# Patient Record
Sex: MALE | Race: WHITE | NOT HISPANIC OR LATINO | URBAN - METROPOLITAN AREA
[De-identification: names, ages, dates, MRNs, and addresses within clinical notes are randomized per-mention and may not be internally consistent; named-entity substitution may affect disease eponyms.]

---

## 2023-04-28 ENCOUNTER — OFFICE VISIT (OUTPATIENT)
Dept: FAMILY MEDICINE CLINIC | Facility: CLINIC | Age: 28
End: 2023-04-28

## 2023-04-28 VITALS
TEMPERATURE: 97.5 F | DIASTOLIC BLOOD PRESSURE: 68 MMHG | BODY MASS INDEX: 23.37 KG/M2 | OXYGEN SATURATION: 99 % | SYSTOLIC BLOOD PRESSURE: 110 MMHG | HEIGHT: 70 IN | WEIGHT: 163.2 LBS | HEART RATE: 66 BPM

## 2023-04-28 DIAGNOSIS — R45.851 SUICIDAL THOUGHTS: ICD-10-CM

## 2023-04-28 DIAGNOSIS — F32.A DEPRESSION, UNSPECIFIED DEPRESSION TYPE: Primary | ICD-10-CM

## 2023-04-28 DIAGNOSIS — F41.9 ANXIETY: ICD-10-CM

## 2023-04-28 RX ORDER — HYDROXYZINE PAMOATE 25 MG/1
CAPSULE ORAL
COMMUNITY
Start: 2023-04-21

## 2023-04-28 RX ORDER — GABAPENTIN 100 MG/1
100 CAPSULE ORAL DAILY
COMMUNITY
Start: 2023-04-20

## 2023-04-28 NOTE — ASSESSMENT & PLAN NOTE
"Presents as new patient today, looking to establish care and mental health medication management  Lengthy conversation  - pt with long standing history depression, anxiety, paranoia and SI (see HPI)  Starting SI in third grade  Reports he was never evaluated as a child  But as an adult, he has been on Celexa and Prozac  through family doctor only  Reports he has suicidal thought daily, but would \"never do it\"  He denies any plan and no hisotry of self harm  But that he does think about it daily - every morning and before bed  Occasionally in the afternoon  this has been ongoing for years  No psychiatric evaluation until recently -  evaluated by a psychiatrist through a tele health visit, and was started on Sertraline and Gabapentin  It was advised he may benefit from Seroquel, but they were going to discuss at follow up visit  Future plan was to start Seroquel at a subsequent visit  Follow up was scheduled for 4/25  He canceled that appt,  in hopes that he could establish here and have us (PCP) manage  We discussed that with his long history of suicidal ideations and paranoia thoughts, he really needs an in depth mental health evaluation so he can receive a proper diagnosis and appropriate medication treatment  Advise he continue with the Telehealth psychiatrist for now until he can establish with an in person provider  Verbal contract with patient today for safety and he was given resources (walk in clinic address, Crisis number)  He reports having a supportive fiance (she is an PA-c in [employer] urgent care)  They live together and she is aware of his past and present mental health  He was advised immediate ER, should he have increased thoughts or develop a suicidal plan  Referral placed today to establish in network - both psychiatry and psychology  Pt hesitant for therapy (as he has concerns he will be talked about after the fact) but is agreeable to referral and will consider   Will also place referral to social " work to assist with other resources if needed  Lastly, patient to have records form prior PCP transferred  Will review once received and will have him return for preventative/wellness exam and update preventative care as needed  Pt verbalizes agreement with all above plans

## 2023-04-28 NOTE — PROGRESS NOTES
"[address]    ASSESSMENT AND PLAN     1  Depression, unspecified depression type  Assessment & Plan:  Presents as new patient today, looking to establish care and mental health medication management  Lengthy conversation  - pt with long standing history depression, anxiety, paranoia and SI (see HPI)  Starting SI in third grade  Reports he was never evaluated as a child  But as an adult, he has been on Celexa and Prozac  through family doctor only  Reports he has suicidal thought daily, but would \"never do it\"  He denies any plan and no hisotry of self harm  But that he does think about it daily - every morning and before bed  Occasionally in the afternoon  this has been ongoing for years  No psychiatric evaluation until recently -  evaluated by a psychiatrist through a tele health visit, and was started on Sertraline and Gabapentin  It was advised he may benefit from Seroquel, but they were going to discuss at follow up visit  Future plan was to start Seroquel at a subsequent visit  Follow up was scheduled for 4/25  He canceled that appt,  in hopes that he could establish here and have us (PCP) manage  We discussed that with his long history of suicidal ideations and paranoia thoughts, he really needs an in depth mental health evaluation so he can receive a proper diagnosis and appropriate medication treatment  Advise he continue with the Telehealth psychiatrist for now until he can establish with an in person provider  Verbal contract with patient today for safety and he was given resources (walk in clinic address, Crisis number)  He reports having a supportive fiance (she is an PA-c in [employer] [facility])  They live together and she is aware of his past and present mental health  He was advised immediate ER, should he have increased thoughts or develop a suicidal plan  Referral placed today to establish in network - both psychiatry and psychology   Pt hesitant for therapy (as he has concerns he will " be talked about after the fact) but is agreeable to referral and will consider  Will also place referral to social work to assist with other resources if needed  Lastly, patient to have records form prior PCP transferred  Will review once received and will have him return for preventative/wellness exam and update preventative care as needed  Pt verbalizes agreement with all above plans  Orders:  -     Ambulatory Referral to Psychiatry; Future  -     Ambulatory Referral to Psychology; Future  -     Ambulatory Referral to Social Work Care Management Program; Future    2  Suicidal thoughts  -     Ambulatory Referral to Social Work Care Management Program; Future    3  Anxiety  -     Ambulatory Referral to Social Work Care Management Program; Future         SUBJECTIVE       Patient ID: Richard Saleem is a 28 y o  male  Chief Complaint   Patient presents with   • Depression   • Anxiety     Pt states that he has had on and off anxiety/depression for the extent of his life  He has had suicidal thoughts in the morning and at night for the past two years  Pt states that he has had social anxiety forever  Pt states that he assumes that people are talking badly about him all of the time  He knows that they probably aren't but he can't convince himself that  He states that it keeps him from doing things  HISTORY OF PRESENT ILLNESS     Patient presents today to establish care in our practice  Several years since last preventative visit  He presents today to discuss severe depression  His adryan (a physician assistant with [facility]) scheduled this  appointment for him  He has struggled with depression, anxiety, and social phobia for years  He reports he has had suicidal thoughts/ideations since the third grade  Never any suicidal attempts - no cutting or other self harm behaviors  Does report though that he has current suicidal thoughts almost daily, for the last 2 years (no active plan)    Although never diagnosed "with paranoia, he admits today having paranoid thoughts daily-continually thinking that people are talking about him or laughing behind his back  He was recently seen through an online psychiatrist service ([facility]), Dr Zilphia Cooks  He was prescribed sertraline 50 and gabapentin 100 at at bedtime, with plan to likely add Seroquel  This tele vist was his first visit with a mental health professional   He has been treated for depression in the past by his PCP with other SSRIs  He reports not having responded well to them: Celexa (not effective), Prozac (side effects - ED)  He is a teacher in [employer]  He was scheduled for a follow-up yesterday, but canceled the appointment  He reports his fiance scheduled this appointment today, hoping that we would manage his mental health medications  The following portions of the patient's history were reviewed and updated as appropriate: allergies, current medications, past family history, past medical history, past social history, past surgical history, and problem list     REVIEW OF SYSTEMS  Review of Systems   Constitutional: Negative  Respiratory: Negative  Cardiovascular: Negative  Neurological: Negative  Psychiatric/Behavioral: Positive for dysphoric mood, sleep disturbance and suicidal ideas  Negative for self-injury  The patient is nervous/anxious           Paranoid thoughts       OBJECTIVE      VITAL SIGNS  /68 (BP Location: Left arm, Patient Position: Sitting, Cuff Size: Standard)   Pulse 66   Temp 97 5 °F (36 4 °C)   Ht 5' 10\" (1 778 m)   Wt 74 kg (163 lb 3 2 oz)   SpO2 99%   BMI 23 42 kg/m²     CURRENT MEDICATIONS    Current Outpatient Medications:   •  hydrOXYzine pamoate (VISTARIL) 25 mg capsule, TAKE 1 CAPSULE BY MOUTH AS NEEDED FOR ANXIETY UP TO TWICE A DAY , Disp: , Rfl:   •  sertraline (ZOLOFT) 50 mg tablet, , Disp: , Rfl:   •  gabapentin (NEURONTIN) 100 mg capsule, Take 100 mg by mouth daily (Patient " not taking: Reported on 4/28/2023), Disp: , Rfl:     Current Facility-Administered Medications:   •  ropivacaine (NAROPIN) 0 5 % injection 10 mL, 10 mL, Infiltration, , Miguel Cluck, DPM, 10 mL at 04/12/23 1804  •  triamcinolone acetonide (KENALOG-40) 40 mg/mL injection 20 mg, 20 mg, Intra-articular, , Miguel Cluck, DPM, 20 mg at 04/12/23 1804      PHYSICAL EXAMINATION   Physical Exam  Vitals and nursing note reviewed  Constitutional:       General: He is not in acute distress  Appearance: Normal appearance  He is not ill-appearing  HENT:      Head: Normocephalic  Pulmonary:      Effort: Pulmonary effort is normal  No respiratory distress  Neurological:      Mental Status: He is alert and oriented to person, place, and time  Psychiatric:         Attention and Perception: Attention normal          Mood and Affect: Mood is anxious  Affect is flat and tearful (few periods during the visit)  Speech: Speech normal  Speech is not rapid and pressured  Behavior: Behavior normal  Behavior is not hyperactive  Thought Content: Thought content is paranoid  Thought content does not include homicidal or suicidal ideation  Thought content does not include homicidal or suicidal plan  Cognition and Memory: Cognition normal          Judgment: Judgment normal  Judgment is not impulsive

## 2023-05-01 ENCOUNTER — TELEPHONE (OUTPATIENT)
Dept: PSYCHIATRY | Facility: CLINIC | Age: 28
End: 2023-05-01

## 2023-05-01 ENCOUNTER — EVALUATION (OUTPATIENT)
Dept: PHYSICAL THERAPY | Facility: CLINIC | Age: 28
End: 2023-05-01

## 2023-05-01 DIAGNOSIS — M24.874 OTHER SPECIFIC JOINT DERANGEMENTS OF RIGHT FOOT, NOT ELSEWHERE CLASSIFIED: ICD-10-CM

## 2023-05-01 DIAGNOSIS — M25.579 SINUS TARSI SYNDROME, UNSPECIFIED LATERALITY: ICD-10-CM

## 2023-05-01 DIAGNOSIS — M25.571 SINUS TARSI SYNDROME, RIGHT: Primary | ICD-10-CM

## 2023-05-01 DIAGNOSIS — M24.571 EQUINUS CONTRACTURE OF RIGHT ANKLE: ICD-10-CM

## 2023-05-01 NOTE — PROGRESS NOTES
PT Evaluation     Today's date: 2023  Patient name: Rashard Mirza  : 1995  MRN: [id number]  Referring provider: Bre Dixon DPM  Dx:   Encounter Diagnosis     ICD-10-CM    1  Sinus tarsi syndrome, right  M25 571 Ambulatory referral to Physical Therapy      2  Other specific joint derangements of right foot, not elsewhere classified  M24 874 Ambulatory referral to Physical Therapy      3  Equinus contracture of right ankle  M24 571 Ambulatory referral to Physical Therapy      4  Sinus tarsi syndrome, unspecified laterality  M25 579                      Assessment  Assessment details: Patient is a 28 y o  male who presents to outpatient PT with pain, decreased rom, decreased strength and decreased functional mobility  He will benefit from skilled PT to address these deficits in order to achieve his goals and maximize his functional mobility  I also re-iterated then benefits of custom orthotics for his condition and that he should follow up with podiatry regarding this  Goals  Short Term Goals: Independent performance of initial hep  Decrease pain 2 points on VAS      Long Term Goals: Independent performance of comprehensive hep  Work performance is returned to max level of function  Performance of IADL's is returned to max level of function  Performance in recreational activities is improved to max level of function  Able to return to running    Plan  Plan details: 3-4 session of formal PT for progress on HEP  Planned therapy interventions: abdominal trunk stabilization, IADL retraining, joint mobilization, manual therapy, massage, strengthening, stretching, therapeutic activities, therapeutic exercise, therapeutic training, transfer training and home exercise program        Subjective Evaluation    History of Present Illness  Mechanism of injury: Pt reports that he has been having R foot/ankle pain since summer of 2022 possible after over-training while running      Reports that since that time he  has stopped running  Reports that he underwent a cortisone injection with good pain reduction but that he still has not found a shoe that is comfortable for him  Reports that orthotics have been recommended for him  Reports that he would like to return to running marathon distance  Pain  Current pain ratin  At worst pain ratin    Patient Goals  Patient goals for therapy: decreased pain, increased motion, increased strength, independence with ADLs/IADLs and return to sport/leisure activities          Objective   ROM:    DF:  PF:   INV:  EV      STRENGTH:  Df:   Pf:   INV:   Ev:        Talar tilt test: neg    Pt has difficulty activating post-tibialis in standing and sitting  Pes planus  Tight gastroc B/L  Tight HS B/L    Valgus noted with single leq squat    R hip strength:  Flexion : -/5  Ext   Abd: -/5  Ir:   ER          Precautions: sinus tarsi syndrome R    Manuals                                                                 Neuro Re-Ed                                                                                                        Ther Ex             Arch raise             gastroc stretch             Single leg squat with pball abd             Single leg bridge             sidestepping             slr x 4                                        Ther Activity             bike                          Gait Training             Running analysis on TM                          Modalities

## 2023-05-01 NOTE — TELEPHONE ENCOUNTER
Patient has been added to the adult psych wait list     Confirmed Insurance: Yes [x]  Location Preference: [place] & [place]  Provider Preference: [facility]  Virtual: Yes [] No [x]

## 2023-05-03 ENCOUNTER — APPOINTMENT (OUTPATIENT)
Dept: PHYSICAL THERAPY | Facility: CLINIC | Age: 28
End: 2023-05-03
Payer: COMMERCIAL

## 2023-05-08 ENCOUNTER — OFFICE VISIT (OUTPATIENT)
Dept: PHYSICAL THERAPY | Facility: CLINIC | Age: 28
End: 2023-05-08

## 2023-05-08 DIAGNOSIS — M25.571 SINUS TARSI SYNDROME, RIGHT: Primary | ICD-10-CM

## 2023-05-08 DIAGNOSIS — M24.874 OTHER SPECIFIC JOINT DERANGEMENTS OF RIGHT FOOT, NOT ELSEWHERE CLASSIFIED: ICD-10-CM

## 2023-05-08 NOTE — PROGRESS NOTES
"Daily Note     Today's date: 2023  Patient name: Kira Lacy  : 1995  MRN: [id number]  Referring provider: Leann Rivera DPM  Dx:   Encounter Diagnosis     ICD-10-CM    1  Sinus tarsi syndrome, right  M25 571       2  Other specific joint derangements of right foot, not elsewhere classified  M24 874                      Subjective: pt reports compliance with his hep and that he is having no difficulty with it  Reports that he is noticing a slight decreased \"in that weird feeling\" in my shoe  Objective: See treatment diary below      Assessment: progressed therex as listed, provided pt with updated hep and blue tb in order to complete this independently  Instructed pt to call the clinic if he has question with his hep or if symptoms fail to improve  If no f/u is requested plan to DC to hep in 4 weeks           Plan: f/u prn     Precautions: sinus tarsi syndrome R    Manuals                                                                 Neuro Re-Ed                                                                                                        Ther Ex             Arch raise             gastroc stretch             Single leg squat with pball abd 2x10            Single leg bridge x20            sidestepping Blue tb x20            slr x 4              SLS with trunk rotation Blue tb x10ea                         Ther Activity             bike                          Gait Training             Running analysis on TM                          Modalities                                            "

## 2023-05-09 ENCOUNTER — PATIENT OUTREACH (OUTPATIENT)
Dept: FAMILY MEDICINE CLINIC | Facility: HOSPITAL | Age: 28
End: 2023-05-09

## 2023-05-09 NOTE — PROGRESS NOTES
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

## 2023-05-10 ENCOUNTER — APPOINTMENT (OUTPATIENT)
Dept: PHYSICAL THERAPY | Facility: CLINIC | Age: 28
End: 2023-05-10
Payer: COMMERCIAL

## 2023-05-24 ENCOUNTER — OFFICE VISIT (OUTPATIENT)
Dept: PODIATRY | Facility: CLINIC | Age: 28
End: 2023-05-24

## 2023-05-24 VITALS
DIASTOLIC BLOOD PRESSURE: 65 MMHG | WEIGHT: 163 LBS | HEIGHT: 70 IN | BODY MASS INDEX: 23.34 KG/M2 | HEART RATE: 78 BPM | SYSTOLIC BLOOD PRESSURE: 109 MMHG

## 2023-05-24 DIAGNOSIS — M24.874 OTHER SPECIFIC JOINT DERANGEMENTS OF RIGHT FOOT, NOT ELSEWHERE CLASSIFIED: Primary | ICD-10-CM

## 2023-05-24 DIAGNOSIS — M21.42 PES PLANUS OF BOTH FEET: ICD-10-CM

## 2023-05-24 DIAGNOSIS — M21.41 PES PLANUS OF BOTH FEET: ICD-10-CM

## 2023-05-24 DIAGNOSIS — M24.571 EQUINUS CONTRACTURE OF RIGHT ANKLE: ICD-10-CM

## 2023-05-24 NOTE — PROGRESS NOTES
PATIENT:  Paul Lau  1995       ASSESSMENT:     1  Other specific joint derangements of right foot, not elsewhere classified        2  Equinus contracture of right ankle        3  Pes planus of both feet                  PLAN:  1  Reviewed medical records  Reviewed the notes from PT  Patient was counseled and educated on the condition and the diagnosis  2  The diagnosis, treatment options and prognosis were discussed with the patient  3  He responded to the injection well  Instructed supportive care, home exercise, icing, and footwear/ arch support  4  Biomechanical exam was performed and he was casted for orthotics  5  Will call him when it is ready to be picked  Imaging: I have personally reviewed pertinent films in PACS  Labs, pathology, and Other Studies: I have personally reviewed pertinent reports  Procedures      Subjective:       HPI  The patient presents for follow-up on pain in right ankle/ foot  Pain resolved after the injection  He had a couple of sessions of PT  No swelling  No associated numbness or paresthesia  No significant weakness or dysfunction  The following portions of the patient's history were reviewed and updated as appropriate: allergies, current medications, past family history, past medical history, past social history, past surgical history and problem list   All pertinent labs and images were reviewed  Past Medical History  Past Medical History:   Diagnosis Date   • Anxiety    • Depression        Past Surgical History  History reviewed  No pertinent surgical history  Allergies:  Patient has no known allergies  Medications:  Current Outpatient Medications   Medication Sig Dispense Refill   • hydrOXYzine pamoate (VISTARIL) 25 mg capsule TAKE 1 CAPSULE BY MOUTH AS NEEDED FOR ANXIETY UP TO TWICE A DAY       • sertraline (ZOLOFT) 50 mg tablet      • gabapentin (NEURONTIN) 100 mg capsule Take 100 mg by mouth daily "(Patient not taking: Reported on 4/28/2023)       Current Facility-Administered Medications   Medication Dose Route Frequency Provider Last Rate Last Admin   • ropivacaine (NAROPIN) 0 5 % injection 10 mL  10 mL Infiltration  Alphonsa Lout, DPM   10 mL at 04/12/23 1804   • triamcinolone acetonide (KENALOG-40) 40 mg/mL injection 20 mg  20 mg Intra-articular  Alphonsa Lout, DPM   20 mg at 04/12/23 1804       Social History:  Social History     Socioeconomic History   • Marital status: Single     Spouse name: None   • Number of children: None   • Years of education: None   • Highest education level: None   Occupational History   • None   Tobacco Use   • Smoking status: Never   • Smokeless tobacco: Never   Vaping Use   • Vaping Use: None   Substance and Sexual Activity   • Alcohol use: Yes     Comment: Social   • Drug use: Never   • Sexual activity: Yes     Partners: Female   Other Topics Concern   • None   Social History Narrative   • None     Social Determinants of Health     Financial Resource Strain: Not on file   Food Insecurity: Not on file   Transportation Needs: Not on file   Physical Activity: Not on file   Stress: Not on file   Social Connections: Not on file   Intimate Partner Violence: Not on file   Housing Stability: Not on file          Review of Systems   Constitutional: Negative for chills and fever  Respiratory: Negative  Cardiovascular: Negative  Gastrointestinal: Negative  Musculoskeletal: Negative for gait problem  Neurological: Negative for weakness and numbness  Objective:      /65   Pulse 78   Ht 5' 10\" (1 778 m)   Wt 73 9 kg (163 lb)   BMI 23 39 kg/m²          Physical Exam  Vitals reviewed  Constitutional:       General: He is not in acute distress  Appearance: Normal appearance  He is not ill-appearing or toxic-appearing  Cardiovascular:      Rate and Rhythm: Normal rate and regular rhythm  Pulses: Normal pulses             Dorsalis pedis pulses are 2+ on the " right side and 2+ on the left side  Posterior tibial pulses are 2+ on the right side and 2+ on the left side  Pulmonary:      Effort: Pulmonary effort is normal  No respiratory distress  Musculoskeletal:         General: Deformity present  No swelling, tenderness or signs of injury  Right lower leg: No edema  Left lower leg: No edema  Right foot: No foot drop  Left foot: No foot drop  Comments: Flexible pes planus presents with hyperpronation at STJ  Improved right STJ ROM  No pain at sinus tarsi / STJ right foot  Skin:     General: Skin is warm  Capillary Refill: Capillary refill takes less than 2 seconds  Coloration: Skin is not cyanotic or mottled  Findings: No abscess, ecchymosis, erythema or wound  Nails: There is no clubbing  Neurological:      General: No focal deficit present  Mental Status: He is alert and oriented to person, place, and time  Cranial Nerves: No cranial nerve deficit  Sensory: No sensory deficit  Motor: No weakness  Coordination: Coordination normal    Psychiatric:         Mood and Affect: Mood normal          Behavior: Behavior normal          Thought Content:  Thought content normal          Judgment: Judgment normal

## 2023-06-08 ENCOUNTER — PATIENT OUTREACH (OUTPATIENT)
Dept: FAMILY MEDICINE CLINIC | Facility: CLINIC | Age: 28
End: 2023-06-08

## 2023-06-08 NOTE — PROGRESS NOTES
NANDO OCHOA called patient ([phone]) a second time  Patient answered, NANDO OCHOA introduced NANDO OCHOA role  Patient currently at work but has time to speak  Patient stated he is on wait list at [facility] for two different locations  NANDO OCHOA provided patient with instructions to find additional providers on insurance portal and to call other office to get on another wait list  Patient stated that he has had fleeting SI but not plan  Patient considers his fiance a support and talks with her about his mental health occasionally  NANDO OCHOA instructed patient that if he is experiencing a mental health emergency, he should call the crisis hotline, 911 or go to there ER  Patient had no other questions, concerns or needs  NANDO OCHOA will follow up regarding in a few weeks

## 2023-06-30 ENCOUNTER — PATIENT OUTREACH (OUTPATIENT)
Dept: FAMILY MEDICINE CLINIC | Facility: CLINIC | Age: 28
End: 2023-06-30

## 2023-06-30 NOTE — PROGRESS NOTES
NANDO OCHOA called patient ([phone]) to follow up regarding establishing care with a [facility] provider  Patient did not answer, NANDO OCHOA left a message including NANDO Ochoa contact information and requested a call back  NANDO OCHOA will attempt to outreach again at a later date

## 2023-07-20 ENCOUNTER — PATIENT OUTREACH (OUTPATIENT)
Dept: FAMILY MEDICINE CLINIC | Facility: CLINIC | Age: 28
End: 2023-07-20

## 2023-07-20 NOTE — PROGRESS NOTES
NANDO OCHOA called patient ([phone]) a second to follow up regarding establishing care with a [address] [place] [address] provider. Patient did not answer, NANDO OCHOA left a message including NANDO Ochoa contact information and requested a call back. NANDO OCHOA will send unable to reach letter via [employer] to patient and close. Please re consult NANDO OCHOA as needed.

## 2023-07-20 NOTE — LETTER
[id number] [address] [phone]    Re: Jak Brochure to Reach   7/20/2023       Dear Kathy Monzon am a [address] Work  and wanted to be certain you had information to contact me should you desire assistance with or have questions about non-medical aspects of your care such as [but not limited to] medical insurance, housing, transportation, material needs, or emergency needs. If I do not have an answer I will assist you in finding the appropriate agency or individual who can help. Please feel free to contact me at [phone]. Thank You.     Sincerely,         SANJEEV Horan

## 2023-07-27 ENCOUNTER — TELEPHONE (OUTPATIENT)
Dept: PODIATRY | Facility: CLINIC | Age: 28
End: 2023-07-27

## 2023-07-27 NOTE — TELEPHONE ENCOUNTER
Caller: Jany De Los Santos    Doctor/Office: Dr. Aron Flynn    #: [phone]    Escalation: No returned call/He was casted for orthotics end of May but has not heard back about picking them up at office? Please call pt and advise as to the situation.  Thanks

## 2023-08-09 ENCOUNTER — OFFICE VISIT (OUTPATIENT)
Dept: PRIMARY CARE | Facility: CLINIC | Age: 28
End: 2023-08-09
Payer: COMMERCIAL

## 2023-08-09 VITALS
DIASTOLIC BLOOD PRESSURE: 70 MMHG | HEART RATE: 75 BPM | WEIGHT: 203 LBS | OXYGEN SATURATION: 97 % | SYSTOLIC BLOOD PRESSURE: 104 MMHG

## 2023-08-09 DIAGNOSIS — Z13.220 LIPID SCREENING: Primary | ICD-10-CM

## 2023-08-09 DIAGNOSIS — L73.0 ACNE KELOIDALIS NUCHAE: ICD-10-CM

## 2023-08-09 LAB
CHOLESTEROL (MG/DL) IN SER/PLAS: 230 MG/DL (ref 0–199)
CHOLESTEROL IN HDL (MG/DL) IN SER/PLAS: 57.9 MG/DL
CHOLESTEROL/HDL RATIO: 4
LDL: 153 MG/DL (ref 0–99)
TRIGLYCERIDE (MG/DL) IN SER/PLAS: 95 MG/DL (ref 0–149)
VLDL: 19 MG/DL (ref 0–40)

## 2023-08-09 PROCEDURE — 80061 LIPID PANEL: CPT

## 2023-08-09 PROCEDURE — 99213 OFFICE O/P EST LOW 20 MIN: CPT | Performed by: FAMILY MEDICINE

## 2023-08-09 RX ORDER — CLINDAMYCIN HYDROCHLORIDE 300 MG/1
300 CAPSULE ORAL 3 TIMES DAILY
Qty: 42 CAPSULE | Refills: 0 | Status: SHIPPED | OUTPATIENT
Start: 2023-08-09 | End: 2023-08-23

## 2023-08-09 RX ORDER — CLOBETASOL PROPIONATE 0.5 MG/G
CREAM TOPICAL 2 TIMES DAILY
Qty: 60 G | Refills: 2 | Status: SHIPPED | OUTPATIENT
Start: 2023-08-09 | End: 2023-11-07

## 2023-08-09 NOTE — PROGRESS NOTES
Subjective   Patient ID: Baljinder Hoffman is a 27 y.o. male who presents for Hyperlipidemia (Cholesterol check , rash on the back of neck ).  HPI  Would cholesterol and DDC screen for hemachromatosis   Would like cholesterol check- father has very high chlosterol concerning for familial     Hx of rash on posterior hair line- chronic, appears to be acne keloid nuchae. Clinda helped but long term doxy did not and upset his stomach. He would like longer course- risk of this discussed    Review of Systems   Constitutional:  Negative for appetite change, fever and unexpected weight change.   HENT:  Negative for congestion and trouble swallowing.    Eyes:  Negative for visual disturbance.   Respiratory:  Negative for shortness of breath.    Cardiovascular:  Negative for chest pain, palpitations and leg swelling.   Gastrointestinal:  Negative for blood in stool, constipation and diarrhea.   Genitourinary:  Negative for difficulty urinating and hematuria.   Musculoskeletal:  Negative for gait problem and joint swelling.   Skin:  Positive for rash. Negative for color change.   Allergic/Immunologic: Negative for immunocompromised state.   Neurological:  Negative for seizures and syncope.   Psychiatric/Behavioral:  Negative for confusion and suicidal ideas. The patient is not nervous/anxious.        Objective   /70   Pulse 75   Wt 92.1 kg (203 lb)   SpO2 97%     Physical Exam  Constitutional:       General: He is not in acute distress.     Appearance: Normal appearance. He is not ill-appearing.   HENT:      Head: Normocephalic and atraumatic.      Right Ear: Tympanic membrane normal.      Left Ear: Tympanic membrane normal.      Nose: Nose normal.      Mouth/Throat:      Mouth: Mucous membranes are moist.   Eyes:      Pupils: Pupils are equal, round, and reactive to light.   Cardiovascular:      Rate and Rhythm: Normal rate and regular rhythm.      Heart sounds: No murmur heard.     No friction rub. No gallop.    Pulmonary:      Effort: Pulmonary effort is normal.      Breath sounds: Normal breath sounds.   Abdominal:      General: Abdomen is flat. There is no distension.      Palpations: Abdomen is soft.      Tenderness: There is no abdominal tenderness. There is no guarding.      Hernia: No hernia is present.   Musculoskeletal:         General: Normal range of motion.   Skin:     General: Skin is warm and dry.   Neurological:      General: No focal deficit present.      Mental Status: He is alert. Mental status is at baseline.      Cranial Nerves: No cranial nerve deficit.      Motor: No weakness.      Gait: Gait normal.   Psychiatric:         Mood and Affect: Mood normal.         Behavior: Behavior normal.         Thought Content: Thought content normal.         Judgment: Judgment normal.         Assessment/Plan   Problem List Items Addressed This Visit    None  Visit Diagnoses       Lipid screening    -  Primary    Relevant Orders    Lipid Panel (Completed)    Acne keloidalis nuchae        Relevant Medications    clindamycin (Cleocin) 300 mg capsule    clobetasol (Temovate) 0.05 % cream          Neck posterior rash: folliculitis (bacteria vs fungal) vs acne keloid vs psorasis   -clindamycin helped but doxy didn't  -clobetasol  -consider topical clinda/benzyl    Fhx for familial hyperlidemia:  -check lipids  -check hfe 2/2 hx

## 2023-08-10 ASSESSMENT — ENCOUNTER SYMPTOMS
JOINT SWELLING: 0
CONSTIPATION: 0
TROUBLE SWALLOWING: 0
UNEXPECTED WEIGHT CHANGE: 0
CONFUSION: 0
COLOR CHANGE: 0
DIARRHEA: 0
HEMATURIA: 0
SHORTNESS OF BREATH: 0
DIFFICULTY URINATING: 0
SEIZURES: 0
NERVOUS/ANXIOUS: 0
PALPITATIONS: 0
APPETITE CHANGE: 0
BLOOD IN STOOL: 0
FEVER: 0

## 2023-08-10 ASSESSMENT — PATIENT HEALTH QUESTIONNAIRE - PHQ9
SUM OF ALL RESPONSES TO PHQ9 QUESTIONS 1 AND 2: 0
1. LITTLE INTEREST OR PLEASURE IN DOING THINGS: NOT AT ALL
2. FEELING DOWN, DEPRESSED OR HOPELESS: NOT AT ALL

## 2023-08-16 ENCOUNTER — APPOINTMENT (OUTPATIENT)
Dept: PRIMARY CARE | Facility: CLINIC | Age: 28
End: 2023-08-16
Payer: COMMERCIAL

## 2023-09-06 ENCOUNTER — OFFICE VISIT (OUTPATIENT)
Dept: PODIATRY | Facility: CLINIC | Age: 28
End: 2023-09-06
Payer: COMMERCIAL

## 2023-09-06 DIAGNOSIS — M21.41 PES PLANUS OF BOTH FEET: ICD-10-CM

## 2023-09-06 DIAGNOSIS — M24.571 EQUINUS CONTRACTURE OF RIGHT ANKLE: ICD-10-CM

## 2023-09-06 DIAGNOSIS — M24.874 OTHER SPECIFIC JOINT DERANGEMENTS OF RIGHT FOOT, NOT ELSEWHERE CLASSIFIED: Primary | ICD-10-CM

## 2023-09-06 DIAGNOSIS — M25.571 SINUS TARSI SYNDROME, RIGHT: ICD-10-CM

## 2023-09-06 DIAGNOSIS — M21.42 PES PLANUS OF BOTH FEET: ICD-10-CM

## 2023-09-06 PROCEDURE — L3000 FT INSERT UCB BERKELEY SHELL: HCPCS | Performed by: PODIATRIST

## 2023-09-06 NOTE — PROGRESS NOTES
The patient was fitted for orthotics and they were dispensed. The instruction was provided. The patient will return in 3-4 weeks for follow-up.

## 2024-07-22 ENCOUNTER — TELEPHONE (OUTPATIENT)
Age: 29
End: 2024-07-22

## 2024-07-22 NOTE — TELEPHONE ENCOUNTER
"Behavioral Health Outpatient Intake Questions    Referred By   : PCP    Please advise interviewee that they need to answer all questions truthfully to allow for best care, and any misrepresentations of information may affect their ability to be seen at this clinic   => Was this discussed? Yes     If Minor Child (under age 18)    Who is/are the legal guardian(s) of the child?     Is there a custody agreement? No     If \"YES\"- Custody orders must be obtained prior to scheduling the first appointment  In addition, Consent to Treatment must be signed by all legal guardians prior to scheduling the first appointment    If \"NO\"- Consent to Treatment must be signed by all legal guardians prior to scheduling the first appointment    Behavioral Health Outpatient Intake History -     Presenting Problem (in patient's own words): Depression, anxiety     Are there any communication barriers for this patient?     No                                               If yes, please describe barriers:     If there is a unique situation, please refer to Sumeet Mendieta/Saba Curry for final determination.    Are you taking any psychiatric medications? Yes     If \"YES\" -What are they Zoloft, Buspar, Vistaril, Neurontin      If \"YES\" -Who prescribes? Pt believes it is called [facility], Psychiatrist outside network.     Has the Patient previously received outpatient Talk Therapy or Medication Management from [facility]  No        If \"YES\"- When, Where and with Whom?         If \"NO\" -Has Patient received these services elsewhere?       If \"YES\" -When, Where, and with Whom?    Has the Patient abused alcohol or other substances in the last 6 months ? No  No concerns of substance abuse are reported.     If \"YES\" -What substance, How much, How often?     If illegal substance: Refer to [facility] (for ELISE[facility]) or [facility].   If Alcohol in excess of 10 drinks per week:  Refer to [facility] (for [facility]ISE[facility]) or [facility]    Legal " "History-     Is this treatment court ordered? No   If \"yes \"send to :  Talk Therapy : Send to Sumeet Mendieta/Saba Curry for final determination   Med Management: Send to Dr Tian for final determination     Has the Patient been convicted of a felony?  No   If \"Yes\" send to -When, What?  Talk Therapy: Send to Sumeet Mendieta/Saba Curry for final determination   Med Management: Send to Dr Tian for final determination     ACCEPTED as a patient Yes  If \"Yes\" Appointment Date: NP appt with Kaushik WYNN 9/12 at 10 am  NP packet sent via [employer]    Referred Elsewhere? No  If “Yes” - (Where? Ex: [facility], [facility], [facility], [facility], etc.)       Name of Insurance Co:[facility] blue shield  Insurance ID#[id number]   Insurance Phone #  If ins is primary or secondary? Primary  If patient is a minor, parents information such as Name, D.O.B of guarantor.  "

## 2024-07-30 ENCOUNTER — TELEPHONE (OUTPATIENT)
Dept: PSYCHIATRY | Facility: CLINIC | Age: 29
End: 2024-07-30

## 2024-07-30 NOTE — TELEPHONE ENCOUNTER
One week follow up call for New Patient appointment with Kaushik Garnica MD on 09/12/2024 was made on 07/30/2024. Writer informed patient of New Patient paperwork needing to be completed 5 days prior to the appointment. Writer confirmed paperwork has been sent via [employer].    Appointment was made on: 07/22/2024

## 2024-08-21 ENCOUNTER — TELEPHONE (OUTPATIENT)
Dept: FAMILY MEDICINE CLINIC | Facility: CLINIC | Age: 29
End: 2024-08-21

## 2024-08-21 NOTE — TELEPHONE ENCOUNTER
Contacted pt, he stated he was supposed to receive a follow up phone call after his last appointment and never did. Pt stated he will be looking for a provider elsewhere.

## 2024-08-21 NOTE — TELEPHONE ENCOUNTER
----- Message from Pooja FORD sent at 8/21/2024  8:47 AM EDT -----  Regarding: Due for physical  Please call patient to schedule yearly physical with Jennifer

## 2024-11-15 ENCOUNTER — OFFICE VISIT (OUTPATIENT)
Dept: PRIMARY CARE | Facility: CLINIC | Age: 29
End: 2024-11-15
Payer: COMMERCIAL

## 2024-11-15 VITALS
DIASTOLIC BLOOD PRESSURE: 76 MMHG | BODY MASS INDEX: 32.85 KG/M2 | OXYGEN SATURATION: 98 % | SYSTOLIC BLOOD PRESSURE: 120 MMHG | HEIGHT: 66 IN | HEART RATE: 71 BPM | WEIGHT: 204.4 LBS

## 2024-11-15 DIAGNOSIS — T14.8XXA SPLINTER: Primary | ICD-10-CM

## 2024-11-15 PROCEDURE — 20520 RMVL FB MUSC/TDN SIMPLE: CPT

## 2024-11-15 PROCEDURE — 99024 POSTOP FOLLOW-UP VISIT: CPT

## 2024-11-15 PROCEDURE — 1036F TOBACCO NON-USER: CPT

## 2024-11-15 RX ORDER — AMOXICILLIN AND CLAVULANATE POTASSIUM 875; 125 MG/1; MG/1
875 TABLET, FILM COATED ORAL 2 TIMES DAILY
Qty: 14 TABLET | Refills: 0 | Status: SHIPPED | OUTPATIENT
Start: 2024-11-15 | End: 2024-11-22

## 2024-11-15 NOTE — PROGRESS NOTES
"Subjective   Patient ID: Baljinder Hoffman is a 29 y.o. male who presents for Foreign Body in Skin (L hand).  HPI  Baljinder presents for a splinter in his L hand that he got 2 days ago   He states he tried to get it out but was unable       Past Surgical History:   Procedure Laterality Date    OTHER SURGICAL HISTORY  06/10/2013    Incisional Hernia Repair      No past medical history on file.  Social History     Tobacco Use    Smoking status: Never    Smokeless tobacco: Never   Substance Use Topics    Alcohol use: Never    Drug use: Never        Review of Systems  10 point review of systems performed and is negative except as noted in the HPI.      Current Outpatient Medications:     amoxicillin-pot clavulanate (Augmentin) 875-125 mg tablet, Take 1 tablet (875 mg) by mouth 2 times a day for 7 days., Disp: 14 tablet, Rfl: 0     Objective   /76   Pulse 71   Ht 1.676 m (5' 6\")   Wt 92.7 kg (204 lb 6.4 oz)   SpO2 98%   BMI 32.99 kg/m²     Physical Exam  Skin:     Comments: L palm has a linear laceration, no active bleeding. Splinter palpated underneath skin on exam. No purulent drainage from the wound.       Patient ID: Baljinder Hoffman is a 29 y.o. male.    Foreign Body Removal    Date/Time: 11/15/2024 3:00 PM    Performed by: Tasha Jones PA-C  Authorized by: Tasha Jones PA-C    Consent:     Consent obtained:  Verbal    Consent given by:  Patient    Risks discussed:  Bleeding, infection, pain and incomplete removal  Location:     Location:  Hand    Hand location:  L palm  Pre-procedure details:     Preparation: Patient was prepped and draped in usual sterile fashion    Anesthesia:     Anesthesia method:  Local infiltration    Local anesthetic:  Lidocaine 1% w/o epi (3mL)  Procedure type:     Procedure complexity:  Simple  Procedure details:     Incision length:  2.5cm    Localization method:  Probed    Removal mechanism:  Forceps    Foreign bodies recovered:  1    Description:  Intact splinter    Intact foreign " body removal: yes    Post-procedure details:     Neurovascular status: intact      Confirmation:  No additional foreign bodies on visualization    Skin closure:  Sutures    Suture size:  4-0    Suture material:  Nylon    Suture technique:  Simple interrupted    Dressing:  Adhesive bandage    Procedure completion:  Tolerated  Comments:      Procedure: removal of splinter (FB) from L palm  Risks, Benefits, Alternatives discussed- verbal consent given  Area cleaned with saline, then alcohol x3  anesthetized with: 1% lidocaine with epi (3mL approx)  Area cleaned with betadine x3, then alcohol x3  Procedure: a 10 blade was used to make a slightly larger incision. Then the area was probed with forceps. A large splinter was removed. The area was then closed with 4 simple interrupted sutures. A band aid was placed.  Patient tolerated well, no complications, written instructions given      Assessment & Plan  Splinter  Splinter successfully removed  4 sutures placed  Start augmentin   Remove sutures in 10 days   He had a tetanus shot 10/20/2020  Discussed signs of infection to monitor for   Case discussed and supervised by Dr. Kirkpatrick   Orders:    amoxicillin-pot clavulanate (Augmentin) 875-125 mg tablet; Take 1 tablet (875 mg) by mouth 2 times a day for 7 days.    Foreign Body Removal          Discussed at visit any disease processes that were of concern as well as the risks, benefits and instructions on any new medication provided. Patient (and/or caretaker of patient if present) stated all questions were answered, and they voiced understanding of instructions.      Tasha Jones PA-C

## 2025-03-12 ENCOUNTER — OFFICE VISIT (OUTPATIENT)
Dept: URGENT CARE | Facility: CLINIC | Age: 30
End: 2025-03-12
Payer: COMMERCIAL

## 2025-03-12 VITALS
HEIGHT: 71 IN | SYSTOLIC BLOOD PRESSURE: 118 MMHG | HEART RATE: 64 BPM | BODY MASS INDEX: 28.34 KG/M2 | OXYGEN SATURATION: 99 % | RESPIRATION RATE: 18 BRPM | DIASTOLIC BLOOD PRESSURE: 57 MMHG | WEIGHT: 202.4 LBS | TEMPERATURE: 97.5 F

## 2025-03-12 DIAGNOSIS — H57.12 LEFT EYE PAIN: ICD-10-CM

## 2025-03-12 DIAGNOSIS — S05.02XA ABRASION OF LEFT CORNEA, INITIAL ENCOUNTER: Primary | ICD-10-CM

## 2025-03-12 PROCEDURE — 99203 OFFICE O/P NEW LOW 30 MIN: CPT | Performed by: PHYSICIAN ASSISTANT

## 2025-03-12 RX ORDER — TETRACAINE HYDROCHLORIDE 5 MG/ML
2 SOLUTION OPHTHALMIC ONCE
Status: COMPLETED | OUTPATIENT
Start: 2025-03-12 | End: 2025-03-12

## 2025-03-12 RX ORDER — TOBRAMYCIN 3 MG/ML
1 SOLUTION/ DROPS OPHTHALMIC
Qty: 5 ML | Refills: 0 | Status: SHIPPED | OUTPATIENT
Start: 2025-03-12

## 2025-03-12 RX ADMIN — TETRACAINE HYDROCHLORIDE 2 DROP: 5 SOLUTION OPHTHALMIC at 11:27

## 2025-03-12 NOTE — PROGRESS NOTES
[facility]      NAME: Roque Coronel is a 29 y.o. male  : 1995    MRN: [id number]  DATE: 2025  TIME: 11:39 AM    Assessment and Plan   Abrasion of left cornea, initial encounter [S05.02XA]  1. Abrasion of left cornea, initial encounter        2. Left eye pain  fluorescein sodium sterile 1 mg ophthalmic strip 1 strip    tetracaine 0.5 % ophthalmic solution 2 drop    tobramycin (TOBREX) 0.3 % SOLN          Patient Instructions   You have scratched your cornea, which is the outer most covering of your eyeball.  Your pain will lessen each day.  Take acetaminophen or ibuprofen for your pain.  You may also apply cool compresses to your eye for your pain.  Take the antibiotic eye drops as prescribed.  DO NOT cover your eye with an eyepatch and DO NOT apply any eye drops to your eye except the antibiotic drops prescribed to you today.  This will delay the healing of your eye and can cause additional, severe eye problems.     Avoid touching your eye and eyelid because this can introduce bacteria to the scratch in your eye.  Follow up with your family doctor or your eye doctor if symptoms are not much improved within 5 days.  Risks and benefits discussed. Patient understands and agrees with the plan.      If tests have been performed at [facility], our office will contact you with results if changes need to be made to the care plan discussed with you at the visit.  You can review your full results on [facility]'s MyChart.     Follow up with PCP in 3-5 days.      If any of the following occur, please report to your nearest ED for evaluation or call 911.   Difficultly breathing or shortness of breath  Chest pain  Acutely worsening symptoms.   To present to the ER if symptoms worsen.  Chief Complaint     Chief Complaint   Patient presents with    Eye Problem     Patient woke up this morning, unable to open his left eye, painful, red, denies drainage, denies other symptoms, and didn't manage with any meds           History of Present Illness   Roque Coronel presents to the clinic c/o    Eye Problem   The left eye is affected. This is a new problem. The current episode started in the past 7 days. The problem occurs constantly. The problem has been unchanged. There was no injury mechanism. The pain is moderate. There is No known exposure to pink eye. He Does not wear contacts. Associated symptoms include an eye discharge (clear) and eye redness. Pertinent negatives include no blurred vision, double vision, fever, itching or photophobia. He has tried nothing for the symptoms. The treatment provided no relief.       Review of Systems   Review of Systems   Constitutional:  Negative for chills, diaphoresis, fatigue and fever.   HENT:  Negative for congestion, ear discharge, ear pain and facial swelling.    Eyes:  Positive for pain, discharge (clear) and redness. Negative for blurred vision, double vision, photophobia, itching and visual disturbance.   Respiratory:  Negative for apnea, cough, chest tightness, shortness of breath and wheezing.    Cardiovascular:  Negative for chest pain and palpitations.   Gastrointestinal:  Negative for abdominal pain.   Skin:  Negative for color change, rash and wound.   Neurological:  Negative for dizziness and headaches.   Hematological:  Negative for adenopathy.         Current Medications     Long-Term Medications   Medication Sig Dispense Refill    gabapentin (NEURONTIN) 100 mg capsule Take 100 mg by mouth daily (Patient not taking: Reported on 4/28/2023)      hydrOXYzine pamoate (VISTARIL) 25 mg capsule TAKE 1 CAPSULE BY MOUTH AS NEEDED FOR ANXIETY UP TO TWICE A DAY.      sertraline (ZOLOFT) 50 mg tablet          Current Allergies     Allergies as of 03/12/2025    (No Known Allergies)            The following portions of the patient's history were reviewed and updated as appropriate: allergies, current medications, past family history, past medical history, past social history, past  surgical history and problem list.  Past Medical History:   Diagnosis Date    Anxiety     Depression      No past surgical history on file.  Social History     Socioeconomic History    Marital status: /Civil Union     Spouse name: Not on file    Number of children: Not on file    Years of education: Not on file    Highest education level: Not on file   Occupational History    Not on file   Tobacco Use    Smoking status: Never    Smokeless tobacco: Never   Vaping Use    Vaping status: Not on file   Substance and Sexual Activity    Alcohol use: Yes     Comment: Social    Drug use: Never    Sexual activity: Yes     Partners: Female   Other Topics Concern    Not on file   Social History Narrative    Not on file     Social Drivers of Health     Financial Resource Strain: Low Risk  (12/16/2024)    Received from [facility]    Overall Financial Resource Strain (CARDIA)     Difficulty of Paying Living Expenses: Not hard at all   Food Insecurity: No Food Insecurity (12/16/2024)    Received from [facility]    Hunger Vital Sign     Worried About Running Out of Food in the Last Year: Never true     Ran Out of Food in the Last Year: Never true   Transportation Needs: No Transportation Needs (12/16/2024)    Received from [facility]    PRAPARE - Transportation     Lack of Transportation (Medical): No     Lack of Transportation (Non-Medical): No   Physical Activity: Not on file   Stress: Not on file   Social Connections: Feeling Socially Isolated (12/16/2024)    Received from [facility]    OASIS : Social Isolation     How often do you feel lonely or isolated from those around you?: Often   Intimate Partner Violence: Not At Risk (12/16/2024)    Received from [facility]    Humiliation, Afraid, Rape, and Kick questionnaire     Fear of Current or Ex-Partner: No     Emotionally Abused: No     Physically Abused: No     Sexually Abused: No  "  Housing Stability: Low Risk  (12/16/2024)    Received from [facility]    Housing Stability Vital Sign     Unable to Pay for Housing in the Last Year: No     Number of Times Moved in the Last Year: 0     Homeless in the Last Year: No       Objective   /57 (BP Location: Left arm, Patient Position: Sitting, Cuff Size: Large)   Pulse 64   Temp 97.5 °F (36.4 °C) (Tympanic)   Resp 18   Ht 5' 11\" (1.803 m)   Wt 91.8 kg (202 lb 6.4 oz)   SpO2 99%   BMI 28.23 kg/m²      Physical Exam     Physical Exam  Vitals and nursing note reviewed.   Constitutional:       General: He is not in acute distress.     Appearance: He is well-developed. He is not diaphoretic.   HENT:      Head: Normocephalic and atraumatic.      Right Ear: Tympanic membrane and external ear normal.      Left Ear: Tympanic membrane and external ear normal.      Nose: Nose normal.      Mouth/Throat:      Mouth: Mucous membranes are moist.      Pharynx: No oropharyngeal exudate or posterior oropharyngeal erythema.   Eyes:      General: Lids are everted, no foreign bodies appreciated. No scleral icterus.        Right eye: No discharge.         Left eye: Discharge (clear) present.     Conjunctiva/sclera:      Left eye: Left conjunctiva is injected.      Pupils: Pupils are equal, round, and reactive to light.   Cardiovascular:      Rate and Rhythm: Normal rate and regular rhythm.      Heart sounds: Normal heart sounds. No murmur heard.     No friction rub. No gallop.   Pulmonary:      Effort: Pulmonary effort is normal. No respiratory distress.      Breath sounds: Normal breath sounds. No decreased breath sounds, wheezing, rhonchi or rales.   Skin:     General: Skin is warm and dry.      Coloration: Skin is not pale.      Findings: No erythema or rash.   Neurological:      Mental Status: He is alert and oriented to person, place, and time.   Psychiatric:         Behavior: Behavior normal.         Thought Content: Thought content " normal.         Judgment: Judgment normal.       Vision is grossly intact.. Eyelid of affected eye was everted and no foreign body was visualized. Eye was anesthetized with 1 drop of tetracaine.  Fluorescein strip was moistened with saline eye wash.  Dye was applied over the inferior cul-de-sac of the eye. Exam was completed with wood's lamp. Eye was then irrigated with saline eye wash copiously.  Morelia Price PA-C